# Patient Record
Sex: MALE | Race: WHITE | NOT HISPANIC OR LATINO | Employment: FULL TIME | URBAN - METROPOLITAN AREA
[De-identification: names, ages, dates, MRNs, and addresses within clinical notes are randomized per-mention and may not be internally consistent; named-entity substitution may affect disease eponyms.]

---

## 2018-01-02 ENCOUNTER — HOSPITAL ENCOUNTER (EMERGENCY)
Facility: HOSPITAL | Age: 38
Discharge: HOME | End: 2018-01-02
Admitting: EMERGENCY MEDICINE

## 2018-01-02 ENCOUNTER — APPOINTMENT (EMERGENCY)
Dept: RADIOLOGY | Facility: HOSPITAL | Age: 38
End: 2018-01-02

## 2018-01-02 VITALS
OXYGEN SATURATION: 97 % | RESPIRATION RATE: 16 BRPM | DIASTOLIC BLOOD PRESSURE: 94 MMHG | SYSTOLIC BLOOD PRESSURE: 163 MMHG | TEMPERATURE: 98.4 F | HEART RATE: 85 BPM

## 2018-01-02 DIAGNOSIS — S82.839A CLOSED FRACTURE OF DISTAL FIBULA: Primary | ICD-10-CM

## 2018-01-02 PROCEDURE — 99283 EMERGENCY DEPT VISIT LOW MDM: CPT

## 2018-01-02 PROCEDURE — 73610 X-RAY EXAM OF ANKLE: CPT

## 2018-01-02 NOTE — DISCHARGE INSTRUCTIONS
REST, ELEVATE, MOTRIN/TYLENOL FOR PAIN  FOLLOW UP WITH ORTHO IN ONE WEEK  Ankle Fracture   WHAT YOU NEED TO KNOW:   An ankle fracture is a break in 1 or more of the bones in your ankle  DISCHARGE INSTRUCTIONS:   Call 911 for any of the following:   · You feel lightheaded, short of breath, and have chest pain  · You cough up blood  Return to the emergency department if:   · Your leg feels warm, tender, and painful  It may look swollen and red  · Blood soaks through your bandage  · You have severe pain in your ankle  · Your cast feels too tight  · Your foot or toes are cold or numb  · Your foot or toenails turn blue or gray  Contact your healthcare provider if:   · Your splint feels too tight  · Your swelling has increased or returned  · You have a fever  · Your pain does not go away, even after treatment  · You have questions or concerns about your condition or care  Medicines: You may need any of the following:  · Acetaminophen  decreases pain and fever  It is available without a doctor's order  Ask how much to take and how often to take it  Follow directions  Acetaminophen can cause liver damage if not taken correctly  · NSAIDs , such as ibuprofen, help decrease swelling, pain, and fever  This medicine is available with or without a doctor's order  NSAIDs can cause stomach bleeding or kidney problems in certain people  If you take blood thinner medicine, always ask your healthcare provider if NSAIDs are safe for you  Always read the medicine label and follow directions  · Prescription pain medicine  may be given  Ask your healthcare provider how to take this medicine safely  · Take your medicine as directed  Contact your healthcare provider if you think your medicine is not helping or if you have side effects  Tell him or her if you are allergic to any medicine  Keep a list of the medicines, vitamins, and herbs you take   Include the amounts, and when and why you take them  Bring the list or the pill bottles to follow-up visits  Carry your medicine list with you in case of an emergency  Follow up with your healthcare provider in 1 to 2 days: Your fracture may need to be reduced (bones pushed back into place) or you may need surgery  Write down your questions so you remember to ask them during your visits  Support devices: You will be given a brace, cast, or splint to limit your movement and protect your ankle  You may need to use crutches to protect your ankle and decrease your pain as you move around  Do not remove your device and do not put weight on your injured ankle  Splint and cast care:  Cover the splint or cast before you bathe so it does not get wet  Tape 2 plastic trash bags to your skin above the cast  Try to keep your ankle out of the water as much as possible  Rest:  Rest your ankle so that it can heal  Return to normal activities as directed  Ice:  Apply ice on your ankle for 15 to 20 minutes every hour or as directed  Use an ice pack, or put crushed ice in a plastic bag  Cover it with a towel  Ice helps prevent tissue damage and decreases swelling and pain  Elevate:  Elevate your ankle above the level of your heart as often as you can  This will help decrease swelling and pain  Prop your ankle on pillows or blankets to keep it elevated comfortably  © 2017 [employer] Information is for End User's use only and may not be sold, redistributed or otherwise used for commercial purposes  All illustrations and images included in CareNotes® are the copyrighted property of [employer]  or [employer]  The above information is an  only  It is not intended as medical advice for individual conditions or treatments  Talk to your doctor, nurse or pharmacist before following any medical regimen to see if it is safe and effective for you

## 2018-01-02 NOTE — ED PROVIDER NOTES
History  Chief Complaint   Patient presents with    Ankle Injury     patient fell on left ankle yesterday  Pain with any weight bearing  Left ankle is swollen  81-year-old male who presents for evaluation of left ankle pain x2 days  He states last evening he slipped going down icy stairs and landed on his left ankle  He believes he everted his ankle  He describes a pain along the lateral malleolus described as pressure-like, constant nonradiating  Pain is 8/10  He states the pain is worse with ambulation and he has trouble bearing weight on his left foot  Has noticed swelling to left lateral malleolus today, no bruising or color change  He denies any pain in the knee or in the foot  He took Motrin yesterday but no medications today prior to arrival   No prior history of fracture left ankle  No head injury or LOC  None       History reviewed  No pertinent past medical history  History reviewed  No pertinent surgical history  History reviewed  No pertinent family history  I have reviewed and agree with the history as documented  Social History   Substance Use Topics    Smoking status: Never Smoker    Smokeless tobacco: Not on file    Alcohol use Yes        Review of Systems   Musculoskeletal: Positive for arthralgias, gait problem and joint swelling  Skin: Negative for color change and wound  Neurological: Negative for weakness and numbness  Physical Exam  ED Triage Vitals [01/02/18 1021]   Temperature Pulse Respirations Blood Pressure SpO2   98 4 °F (36 9 °C) 85 16 163/94 97 %      Temp Source Heart Rate Source Patient Position - Orthostatic VS BP Location FiO2 (%)   Oral -- Sitting Left arm --      Pain Score       8           Orthostatic Vital Signs  Vitals:    01/02/18 1021   BP: 163/94   Pulse: 85   Patient Position - Orthostatic VS: Sitting       Physical Exam   Constitutional: He is oriented to person, place, and time   He appears well-developed and well-nourished  Non-toxic appearance  He does not have a sickly appearance  He does not appear ill  No distress  HENT:   Head: Normocephalic and atraumatic  Right Ear: External ear normal    Left Ear: External ear normal    Nose: Nose normal    Mouth/Throat: Oropharynx is clear and moist    Eyes: Conjunctivae and EOM are normal  Pupils are equal, round, and reactive to light  Neck: Normal range of motion  Neck supple  Cardiovascular: Normal rate, regular rhythm and normal heart sounds  Exam reveals no gallop and no friction rub  No murmur heard  Pulmonary/Chest: Effort normal and breath sounds normal  No respiratory distress  He has no wheezes  He has no rales  Musculoskeletal:        Left ankle: He exhibits decreased range of motion and swelling  He exhibits no ecchymosis, no deformity, no laceration and normal pulse  Tenderness  Lateral malleolus tenderness found  No medial malleolus, no head of 5th metatarsal and no proximal fibula tenderness found  Achilles tendon exhibits no pain and no defect  Feet:    Mild swelling left lateral malleolus  No bruising or color change  Tenderness to palpation along the lateral malleolus and distal fibula  Nontender palpation at medial malleolus, head of 5th metatarsal and proximal tibia or fibula  Patient is unable to dorsiflex due to pain but is able to plantar flex  Wiggles toes  Distal sensation and circulation intact  Cap refill less than 2 seconds  DP pulse 2 +   Neurological: He is alert and oriented to person, place, and time  Skin: Skin is warm and dry  Capillary refill takes less than 2 seconds  No rash noted  He is not diaphoretic  Psychiatric: He has a normal mood and affect  Nursing note and vitals reviewed        ED Medications  Medications - No data to display    Diagnostic Studies  Results Reviewed     None                 XR ankle 3+ views LEFT   ED Interpretation by Burke Quintanilla PA-C (01/02 1033)   Abnormal   Fracture distal fibula      Final Result by Tahmina Domínguez DO (01/02 1034)   Nondisplaced distal fibular fracture  Workstation performed: [id number]                    Procedures  Static Splint Application  Date/Time: 1/2/2018 10:55 AM  Performed by: Lashawn Vicente  Authorized by: Lashawn Vicente     Patient location:  Bedside and ED  Procedure performed by emergency physician: No    Other Assisting Provider: Yes (comment) (ED tech)    Comments:      Splint application: Posterior short leg Splint was applied, Applied by technician, good position, neurovascular tendon intact, good capillary refill  Evaluated by me prior to discharge  Phone Contacts  ED Phone Contact    ED Course  ED Course                                MDM  Number of Diagnoses or Management Options  Closed fracture of distal fibula: new and requires workup  Diagnosis management comments:   Posterior splint applied and crutches given  Recommended rest, ice, elevation and Motrin or Tylenol for pain  Follow up with Ortho  Return to ED for worsening  Patient verbalizes understanding and agrees the plan  Amount and/or Complexity of Data Reviewed  Tests in the radiology section of CPT®: reviewed  Independent visualization of images, tracings, or specimens: yes    Patient Progress  Patient progress: stable    CritCare Time    Disposition  Final diagnoses:   Closed fracture of distal fibula     Time reflects when diagnosis was documented in both MDM as applicable and the Disposition within this note     Time User Action Codes Description Comment    1/2/2018 10:49 AM Moni Monteiro Si Add [[id number]] Closed fracture of distal fibula       ED Disposition     ED Disposition Condition Comment    Discharge  yTe Grew discharge to home/self care      Condition at discharge: Good        Follow-up Information     Follow up With Specialties Details [facility] In 1 week  [address] [id number] [facility] Emergency Department Emergency Medicine  If symptoms worsen [address] [place] [address]  [phone]  ED, [address], [facility] [address]        Patient's Medications    No medications on file     No discharge procedures on file      ED Provider  Electronically Signed by           Juan C Adames PA-C  01/02/18 0488

## 2018-01-02 NOTE — ED NOTES
Hoping for something more than tylenol or motrin for pain    Declines offer     Perla Plata RN  01/02/18 1024

## 2018-01-04 ENCOUNTER — GENERIC CONVERSION - ENCOUNTER (OUTPATIENT)
Dept: OTHER | Facility: OTHER | Age: 38
End: 2018-01-04

## 2018-01-04 ENCOUNTER — APPOINTMENT (OUTPATIENT)
Dept: RADIOLOGY | Facility: CLINIC | Age: 38
End: 2018-01-04

## 2018-01-04 DIAGNOSIS — S82.832A OTHER FRACTURE OF UPPER AND LOWER END OF LEFT FIBULA, INITIAL ENCOUNTER FOR CLOSED FRACTURE: ICD-10-CM

## 2018-01-04 DIAGNOSIS — M25.579 PAIN IN ANKLE: ICD-10-CM

## 2018-01-04 PROCEDURE — 73590 X-RAY EXAM OF LOWER LEG: CPT

## 2018-01-04 PROCEDURE — 73610 X-RAY EXAM OF ANKLE: CPT

## 2018-01-11 ENCOUNTER — GENERIC CONVERSION - ENCOUNTER (OUTPATIENT)
Dept: OTHER | Facility: OTHER | Age: 38
End: 2018-01-11

## 2018-01-11 ENCOUNTER — APPOINTMENT (OUTPATIENT)
Dept: RADIOLOGY | Facility: CLINIC | Age: 38
End: 2018-01-11

## 2018-01-11 DIAGNOSIS — S82.832A OTHER FRACTURE OF UPPER AND LOWER END OF LEFT FIBULA, INITIAL ENCOUNTER FOR CLOSED FRACTURE: ICD-10-CM

## 2018-01-11 PROCEDURE — 73610 X-RAY EXAM OF ANKLE: CPT

## 2018-01-24 VITALS — DIASTOLIC BLOOD PRESSURE: 84 MMHG | SYSTOLIC BLOOD PRESSURE: 133 MMHG | WEIGHT: 220 LBS | HEART RATE: 86 BPM

## 2018-01-24 VITALS — DIASTOLIC BLOOD PRESSURE: 84 MMHG | HEART RATE: 98 BPM | SYSTOLIC BLOOD PRESSURE: 124 MMHG
